# Patient Record
Sex: FEMALE | Race: BLACK OR AFRICAN AMERICAN | URBAN - METROPOLITAN AREA
[De-identification: names, ages, dates, MRNs, and addresses within clinical notes are randomized per-mention and may not be internally consistent; named-entity substitution may affect disease eponyms.]

---

## 2023-07-21 ENCOUNTER — OFFICE VISIT (OUTPATIENT)
Age: 28
End: 2023-07-21
Payer: MEDICAID

## 2023-07-21 VITALS
HEIGHT: 64 IN | DIASTOLIC BLOOD PRESSURE: 61 MMHG | OXYGEN SATURATION: 98 % | HEART RATE: 58 BPM | BODY MASS INDEX: 26.98 KG/M2 | TEMPERATURE: 98.2 F | SYSTOLIC BLOOD PRESSURE: 100 MMHG | WEIGHT: 158 LBS

## 2023-07-21 DIAGNOSIS — N76.0 BACTERIAL VAGINITIS: Primary | ICD-10-CM

## 2023-07-21 DIAGNOSIS — B96.89 BACTERIAL VAGINITIS: Primary | ICD-10-CM

## 2023-07-21 PROCEDURE — 99213 OFFICE O/P EST LOW 20 MIN: CPT | Performed by: NURSE PRACTITIONER

## 2023-07-21 RX ORDER — FLUCONAZOLE 100 MG/1
100 TABLET ORAL DAILY
Qty: 14 TABLET | Refills: 0 | Status: CANCELLED | OUTPATIENT
Start: 2023-07-21 | End: 2023-08-04

## 2023-07-21 RX ORDER — DOLUTEGRAVIR SODIUM 50 MG/1
50 TABLET, FILM COATED ORAL DAILY
COMMUNITY
Start: 2023-06-28

## 2023-07-21 RX ORDER — EMTRICITABINE AND TENOFOVIR ALAFENAMIDE 200; 25 MG/1; MG/1
TABLET ORAL DAILY
COMMUNITY
Start: 2023-06-28

## 2023-07-21 RX ORDER — CLINDAMYCIN PHOSPHATE 20 MG/G
CREAM VAGINAL
Qty: 7 EACH | Refills: 0 | Status: SHIPPED | OUTPATIENT
Start: 2023-07-21

## 2023-07-21 RX ORDER — M-VIT,TX,IRON,MINS/CALC/FOLIC 27MG-0.4MG
1 TABLET ORAL DAILY
COMMUNITY

## 2023-07-21 SDOH — ECONOMIC STABILITY: FOOD INSECURITY: WITHIN THE PAST 12 MONTHS, THE FOOD YOU BOUGHT JUST DIDN'T LAST AND YOU DIDN'T HAVE MONEY TO GET MORE.: PATIENT DECLINED

## 2023-07-21 SDOH — ECONOMIC STABILITY: FOOD INSECURITY: WITHIN THE PAST 12 MONTHS, YOU WORRIED THAT YOUR FOOD WOULD RUN OUT BEFORE YOU GOT MONEY TO BUY MORE.: SOMETIMES TRUE

## 2023-07-21 SDOH — ECONOMIC STABILITY: INCOME INSECURITY: HOW HARD IS IT FOR YOU TO PAY FOR THE VERY BASICS LIKE FOOD, HOUSING, MEDICAL CARE, AND HEATING?: NOT VERY HARD

## 2023-07-21 SDOH — ECONOMIC STABILITY: HOUSING INSECURITY
IN THE LAST 12 MONTHS, WAS THERE A TIME WHEN YOU DID NOT HAVE A STEADY PLACE TO SLEEP OR SLEPT IN A SHELTER (INCLUDING NOW)?: NO

## 2023-07-21 ASSESSMENT — PATIENT HEALTH QUESTIONNAIRE - PHQ9
SUM OF ALL RESPONSES TO PHQ QUESTIONS 1-9: 0
1. LITTLE INTEREST OR PLEASURE IN DOING THINGS: 0
SUM OF ALL RESPONSES TO PHQ QUESTIONS 1-9: 0
SUM OF ALL RESPONSES TO PHQ QUESTIONS 1-9: 0
SUM OF ALL RESPONSES TO PHQ9 QUESTIONS 1 & 2: 0
SUM OF ALL RESPONSES TO PHQ QUESTIONS 1-9: 0
2. FEELING DOWN, DEPRESSED OR HOPELESS: 0

## 2023-07-21 NOTE — PROGRESS NOTES
1. \"Have you been to the ER, urgent care clinic since your last visit? Hospitalized since your last visit? \" No    2. \"Have you seen or consulted any other health care providers outside of the [address] [place] [address] since your last visit? \" Yes [facility] Infectious disease       3. For patients aged 43-73: Has the patient had a colonoscopy / FIT/ Cologuard? NA - based on age      If the patient is female:    4. For patients aged 43-66: Has the patient had a mammogram within the past 2 years? NA - based on age or sex      11. For patients aged 21-65: Has the patient had a pap smear?  Yes - no Care Gap present- nov 2022
Vision grossly intact. Gaze aligned appropriately. Right eye: No discharge. Left eye: No discharge. Conjunctiva/sclera:      Right eye: Right conjunctiva is not injected. No exudate or hemorrhage. Left eye: Left conjunctiva is not injected. No exudate or hemorrhage. Comments: Under bilateral eyes with swelling   Genitourinary:     Comments: Not completed because she brought daughter to her appt          An electronic signature was used to authenticate this note.     --ADELA Carr NP on 7/23/2023 at 9:07 PM

## 2023-07-23 RX ORDER — FLUCONAZOLE 150 MG/1
150 TABLET ORAL
Qty: 3 TABLET | Refills: 0 | Status: SHIPPED | OUTPATIENT
Start: 2023-07-23 | End: 2023-07-30

## 2023-07-23 ASSESSMENT — ENCOUNTER SYMPTOMS: FACIAL SWELLING: 1

## 2023-07-23 ASSESSMENT — VISUAL ACUITY: OU: 1

## 2023-08-02 ENCOUNTER — OFFICE VISIT (OUTPATIENT)
Age: 28
End: 2023-08-02
Payer: MEDICAID

## 2023-08-02 VITALS
RESPIRATION RATE: 17 BRPM | OXYGEN SATURATION: 99 % | HEART RATE: 65 BPM | DIASTOLIC BLOOD PRESSURE: 56 MMHG | TEMPERATURE: 99 F | BODY MASS INDEX: 26.91 KG/M2 | WEIGHT: 157.6 LBS | HEIGHT: 64 IN | SYSTOLIC BLOOD PRESSURE: 111 MMHG

## 2023-08-02 DIAGNOSIS — Z11.59 ENCOUNTER FOR HEPATITIS C SCREENING TEST FOR LOW RISK PATIENT: Primary | ICD-10-CM

## 2023-08-02 DIAGNOSIS — Z12.4 CERVICAL CANCER SCREENING: ICD-10-CM

## 2023-08-02 DIAGNOSIS — N89.8 VAGINAL DISCHARGE: ICD-10-CM

## 2023-08-02 DIAGNOSIS — Z11.3 SCREENING FOR STD (SEXUALLY TRANSMITTED DISEASE): ICD-10-CM

## 2023-08-02 DIAGNOSIS — Z00.00 ANNUAL PHYSICAL EXAM: ICD-10-CM

## 2023-08-02 DIAGNOSIS — Z23 NEED FOR DIPHTHERIA-TETANUS-PERTUSSIS (TDAP) VACCINE: ICD-10-CM

## 2023-08-02 DIAGNOSIS — N63.11 MASS OF UPPER OUTER QUADRANT OF RIGHT BREAST: ICD-10-CM

## 2023-08-02 DIAGNOSIS — B37.31 VAGINAL CANDIDA: ICD-10-CM

## 2023-08-02 PROCEDURE — PBSHW TDAP, BOOSTRIX, (AGE 10 YRS+), IM: Performed by: NURSE PRACTITIONER

## 2023-08-02 PROCEDURE — 99385 PREV VISIT NEW AGE 18-39: CPT | Performed by: NURSE PRACTITIONER

## 2023-08-02 PROCEDURE — 90715 TDAP VACCINE 7 YRS/> IM: CPT | Performed by: NURSE PRACTITIONER

## 2023-08-02 RX ORDER — CHLORHEXIDINE GLUCONATE 0.12 MG/ML
RINSE ORAL
COMMUNITY
Start: 2023-07-30

## 2023-08-02 RX ORDER — FLUCONAZOLE 150 MG/1
150 TABLET ORAL ONCE
Qty: 2 TABLET | Refills: 0 | Status: SHIPPED | OUTPATIENT
Start: 2023-08-02 | End: 2023-08-02

## 2023-08-02 RX ORDER — AMOXICILLIN AND CLAVULANATE POTASSIUM 875; 125 MG/1; MG/1
TABLET, FILM COATED ORAL
COMMUNITY
Start: 2023-07-30

## 2023-08-02 ASSESSMENT — ENCOUNTER SYMPTOMS
ABDOMINAL PAIN: 0
SHORTNESS OF BREATH: 0
VOMITING: 0
NAUSEA: 0
COUGH: 0
CHEST TIGHTNESS: 0

## 2023-08-02 NOTE — PROGRESS NOTES
Dre Sow (:  1995) is a 25 y.o. female, here for evaluation of the following medical concerns:  Chief Complaint   Patient presents with    Annual Exam    Skin Problem     On arms and back due to          ASSESSMENT/PLAN:  1. Encounter for hepatitis C screening test for low risk patient    - Hepatitis C Antibody; Future    2. Cervical cancer screening    - PAP IG, CT-NG-TV, Aptima HPV and rfx 16/18,45 ([id number]); Future    3. Annual physical exam    - CBC with Auto Differential; Future  - Comprehensive Metabolic Panel; Future  - Hemoglobin A1C; Future  - Lipid Panel; Future  - TSH; Future  - Urinalysis with Microscopic; Future    4. Vaginal discharge    - NuSwab VG Plus+Mycoplasmas,JON; Future  - diflucan, start cleocin cream    Return in about 1 year (around 2024) for Grace Boudreaux. She is here today for annual physical, pap and nuswab, and labs. Skin Problem  Pertinent negatives include no cough, fatigue, shortness of breath or vomiting. She reports exposure to fiberglass- no issues noted today    She is taking tivicay and descovy for hx HIV- [facility] ID- undetectable per pt     She is taking supplemental multivitamin and potassium    Tooth abscess- taking amoxicillin and peridex needs dental care    Right breast lump- discomfort, movable, no rash, dx mammogram ordered    Vaginal discharge- hx BV, did not trial the cleocin cream, sent in diflucan as on abx, nuswab today    Review of Systems   Constitutional:  Negative for diaphoresis and fatigue. Respiratory:  Negative for cough, chest tightness and shortness of breath. Cardiovascular:  Negative for chest pain, palpitations and leg swelling. Gastrointestinal:  Negative for abdominal pain, nausea and vomiting. Neurological:  Negative for dizziness, weakness and headaches. Prior to Visit Medications    Medication Sig Taking?  Authorizing Provider   amoxicillin-clavulanate (AUGMENTIN) 875-125 MG per

## 2023-08-02 NOTE — PROGRESS NOTES
1. \"Have you been to the ER, urgent care clinic since your last visit? Hospitalized since your last visit? \" Yes When: 7/29 Where: [facility]  Reason for visit: abscess    2. \"Have you seen or consulted any other health care providers outside of the [address] [place] [address] since your last visit? \" Yes [facility] infectious      3. For patients aged 43-73: Has the patient had a colonoscopy / FIT/ Cologuard? NA - based on age      If the patient is female:    4. For patients aged 43-66: Has the patient had a mammogram within the past 2 years? NA - based on age or sex      11. For patients aged 21-65: Has the patient had a pap smear?  Yes - no Care Gap present

## 2023-08-05 LAB
A/G RATIO: 1.5 RATIO (ref 1.1–2.6)
ALBUMIN SERPL-MCNC: 4.6 G/DL (ref 3.5–5)
ALP BLD-CCNC: 65 U/L (ref 25–115)
ALT SERPL-CCNC: 21 U/L (ref 5–40)
ANION GAP SERPL CALCULATED.3IONS-SCNC: 12 MMOL/L (ref 3–15)
AST SERPL-CCNC: 22 U/L (ref 10–37)
AVERAGE GLUCOSE: 103 MG/DL (ref 91–123)
BACTERIA: NEGATIVE
BILIRUB SERPL-MCNC: 0.2 MG/DL (ref 0.2–1.2)
BILIRUB SERPL-MCNC: NEGATIVE MG/DL
BLOOD: NEGATIVE
BUN BLDV-MCNC: 16 MG/DL (ref 6–22)
CALCIUM SERPL-MCNC: 9.8 MG/DL (ref 8.4–10.5)
CHLORIDE BLD-SCNC: 100 MMOL/L (ref 98–110)
CHOLESTEROL/HDL RATIO: 3.3 (ref 0–5)
CHOLESTEROL: 175 MG/DL (ref 110–200)
CLARITY: CLEAR
CO2: 27 MMOL/L (ref 20–32)
COLOR: YELLOW
CREAT SERPL-MCNC: 0.8 MG/DL (ref 0.5–1.2)
EPITHELIAL CELLS: ABNORMAL /HPF
GLOBULIN: 3.1 G/DL (ref 2–4)
GLOMERULAR FILTRATION RATE: >60 ML/MIN/1.73 SQ.M.
GLUCOSE: 79 MG/DL (ref 70–99)
GLUCOSE: NEGATIVE MG/DL
HBA1C MFR BLD: 5.2 % (ref 4.8–5.6)
HCT VFR BLD CALC: 45.5 % (ref 35.1–46.5)
HDLC SERPL-MCNC: 53 MG/DL
HEMOGLOBIN: 14 G/DL (ref 11.7–15.5)
HEPATITIS BE ANTIGEN: NONREACTIVE
HEPATITIS C ANTIBODY: NORMAL
HIV -1/0/2 AG/AB WITH REFLEX: ABNORMAL
HIV INTERPRETATION: ABNORMAL
HIV-1 ANTIBODY: REACTIVE
HIV-2 AB: NON REACTIVE
HYALINE CASTS: ABNORMAL /LPF (ref 0–2)
KETONES, URINE: NEGATIVE MG/DL
LDL CHOLESTEROL CALCULATED: 113 MG/DL (ref 50–99)
LDL/HDL RATIO: 2.1
LEUKOCYTE ESTERASE, URINE: NEGATIVE
Lab: ABNORMAL
MCH RBC QN AUTO: 31 PG (ref 26–34)
MCHC RBC AUTO-ENTMCNC: 31 G/DL (ref 31–36)
MCV RBC AUTO: 100 FL (ref 80–99)
NITRITE, URINE: NEGATIVE
NON-HDL CHOLESTEROL: 122 MG/DL
NORMACHROMIC RBC: NORMAL
NORMACYTIC RBC: NORMAL
PDW BLD-RTO: 13.3 % (ref 10–15.5)
PH, URINE: 6.5 PH (ref 5–8)
PLATELET # BLD: 95 K/UL (ref 140–440)
PLATELET, IMMATURE FRACTION: 18.7 % (ref 1.1–7.1)
PMV BLD AUTO: 12.8 FL (ref 9–13)
POTASSIUM SERPL-SCNC: 4.6 MMOL/L (ref 3.5–5.5)
PROTEIN UA: NEGATIVE MG/DL
RBC URINE: ABNORMAL /HPF
RBC: 4.57 M/UL (ref 3.8–5.2)
SMEAR REVIEW: NORMAL
SODIUM BLD-SCNC: 139 MMOL/L (ref 133–145)
SPECIFIC GRAVITY: 1 (ref 1–1.03)
T. PALLIDUM, IGG/IGM: NON REACTIVE
TOTAL PROTEIN: 7.7 G/DL (ref 6.4–8.3)
TRIGL SERPL-MCNC: 46 MG/DL (ref 40–149)
TSH SERPL DL<=0.05 MIU/L-ACNC: 0.41 MCU/ML (ref 0.27–4.2)
UROBILINOGEN: 0.2 MG/DL
VLDLC SERPL CALC-MCNC: 9 MG/DL (ref 8–30)
WBC UA: ABNORMAL /HPF (ref 0–5)
WBC: 4.6 K/UL (ref 4–11)

## 2023-08-08 LAB
BACTERIAL VAGINOSIS, NAA: POSITIVE
C TRACH DNA SPEC NAA+PROBE: NEGATIVE
CANDIDA GLABRATA, NAA: NEGATIVE
CANDIDA SPECIES, NAA: POSITIVE
MYCOPLASMA GENITALIUM, SWAB: NEGATIVE
MYCOPLASMA HOMINIS, SWAB: NEGATIVE
NEISSERIA GONORRHOEAE, NAA: NEGATIVE
TRICHOMONAS VAGINALIS BY NAA: NEGATIVE
UREAPLASMA SPP, SWAB: POSITIVE

## 2023-08-09 LAB
CHLAMYDIA TRACHOMATIS THINPREP: NEGATIVE
HPV DNA HIGH RISK: NEGATIVE
HPV GENOTYPE: NEGATIVE
HPV, GENOTYPE 18: NEGATIVE
NEISSERIA GONORRHOEAE THINPREP: NEGATIVE
TRICHOMONAS NUC AMP-THIN PREP: NEGATIVE

## 2023-08-10 ENCOUNTER — TELEPHONE (OUTPATIENT)
Age: 28
End: 2023-08-10

## 2023-08-10 NOTE — TELEPHONE ENCOUNTER
----- Message from ADELA Ruth NP sent at 8/6/2023  8:49 PM EDT -----  Labs show that your platelets are very low please discuss this immediately with your ID provider.

## 2023-08-10 NOTE — TELEPHONE ENCOUNTER
Reached out to patient in regards to labs. But there was no answer. Left vm to give our office a call.

## 2023-08-11 LAB — PAP IMAGE GUIDED: NORMAL

## 2023-08-14 DIAGNOSIS — A49.3 NONGONOCOCCAL URETHRITIS DUE TO UREAPLASMA UREALYTICUM: Primary | ICD-10-CM

## 2023-08-14 DIAGNOSIS — N34.1 NONGONOCOCCAL URETHRITIS DUE TO UREAPLASMA UREALYTICUM: Primary | ICD-10-CM

## 2023-08-14 DIAGNOSIS — B37.31 CANDIDA VAGINITIS: ICD-10-CM

## 2023-08-14 RX ORDER — DOXYCYCLINE HYCLATE 100 MG
100 TABLET ORAL 2 TIMES DAILY
Qty: 20 TABLET | Refills: 0 | Status: SHIPPED | OUTPATIENT
Start: 2023-08-14 | End: 2023-08-24

## 2023-08-14 RX ORDER — FLUCONAZOLE 150 MG/1
150 TABLET ORAL ONCE
Qty: 1 TABLET | Refills: 0 | Status: SHIPPED | OUTPATIENT
Start: 2023-08-14 | End: 2023-08-14

## 2023-09-18 ENCOUNTER — OFFICE VISIT (OUTPATIENT)
Age: 28
End: 2023-09-18

## 2023-09-18 DIAGNOSIS — Z91.199 NO-SHOW FOR APPOINTMENT: Primary | ICD-10-CM

## 2023-09-18 NOTE — PROGRESS NOTES
1. \"Have you been to the ER, urgent care clinic since your last visit? Hospitalized since your last visit? \" No    2. \"Have you seen or consulted any other health care providers outside of the [address] [place] [address] since your last visit? \" Yes ID      3. For patients aged 43-73: Has the patient had a colonoscopy / FIT/ Cologuard? NA - based on age      If the patient is female:    4. For patients aged 43-66: Has the patient had a mammogram within the past 2 years? NA - based on age or sex      11. For patients aged 21-65: Has the patient had a pap smear? Yes - Care Gap present.  Most recent result on file

## 2023-09-22 ENCOUNTER — PATIENT MESSAGE (OUTPATIENT)
Age: 28
End: 2023-09-22

## 2023-09-22 PROBLEM — Z21 ASYMPTOMATIC HIV INFECTION, WITH NO HISTORY OF HIV-RELATED ILLNESS (HCC): Status: ACTIVE | Noted: 2023-09-22

## 2024-08-02 DIAGNOSIS — Z00.00 ANNUAL PHYSICAL EXAM: Primary | ICD-10-CM

## 2025-02-10 ENCOUNTER — OFFICE VISIT (OUTPATIENT)
Facility: CLINIC | Age: 30
End: 2025-02-10
Payer: MEDICAID

## 2025-02-10 VITALS
OXYGEN SATURATION: 97 % | HEART RATE: 62 BPM | WEIGHT: 166 LBS | DIASTOLIC BLOOD PRESSURE: 48 MMHG | BODY MASS INDEX: 28.34 KG/M2 | HEIGHT: 64 IN | SYSTOLIC BLOOD PRESSURE: 99 MMHG

## 2025-02-10 DIAGNOSIS — S66.912A WRIST STRAIN, LEFT, INITIAL ENCOUNTER: Primary | ICD-10-CM

## 2025-02-10 PROCEDURE — 99213 OFFICE O/P EST LOW 20 MIN: CPT | Performed by: NURSE PRACTITIONER

## 2025-02-10 RX ORDER — IBUPROFEN 600 MG/1
TABLET, FILM COATED ORAL
COMMUNITY
Start: 2024-11-19

## 2025-02-10 RX ORDER — PSEUDOEPHED/ACETAMINOPH/DIPHEN 30MG-500MG
1 TABLET ORAL 3 TIMES DAILY
COMMUNITY
Start: 2024-11-19

## 2025-02-10 ASSESSMENT — PATIENT HEALTH QUESTIONNAIRE - PHQ9
SUM OF ALL RESPONSES TO PHQ QUESTIONS 1-9: 0
SUM OF ALL RESPONSES TO PHQ9 QUESTIONS 1 & 2: 0
SUM OF ALL RESPONSES TO PHQ QUESTIONS 1-9: 0
1. LITTLE INTEREST OR PLEASURE IN DOING THINGS: NOT AT ALL
SUM OF ALL RESPONSES TO PHQ QUESTIONS 1-9: 0
2. FEELING DOWN, DEPRESSED OR HOPELESS: NOT AT ALL
SUM OF ALL RESPONSES TO PHQ QUESTIONS 1-9: 0

## 2025-02-10 ASSESSMENT — ENCOUNTER SYMPTOMS
NAUSEA: 0
VOMITING: 0
CHEST TIGHTNESS: 0
ABDOMINAL PAIN: 0
COUGH: 0
SHORTNESS OF BREATH: 0

## 2025-02-10 NOTE — PROGRESS NOTES
\"Have you been to the ER, urgent care clinic since your last visit?  Hospitalized since your last visit?\"    NO    “Have you seen or consulted any other health care providers outside our system since your last visit?”    NO           
08/02/2023    GLOB 3.1 08/02/2023     Lab Results   Component Value Date    TSH 0.41 08/02/2023     Hemoglobin A1C   Date Value Ref Range Status   08/02/2023 5.2 4.8 - 5.6 % Final     Lab Results   Component Value Date    CHOL 175 08/02/2023     Lab Results   Component Value Date    TRIG 46 08/02/2023     Lab Results   Component Value Date    HDL 53 08/02/2023     No components found for: \"LDLCHOLESTEROL\", \"LDLCALC\"  Lab Results   Component Value Date    VLDL 9 08/02/2023     Lab Results   Component Value Date    CHOLHDLRATIO 3.3 08/02/2023         Physical Exam    Disclaimer:    The patient understands our medical plan.  Alternatives have been explained and offered.  The risks, benefits and significant side effects of all medications have been reviewed. Anticipated time course and progression of condition reviewed. All questions have been addressed.  She is encouraged to employ the information provided in the after visit summary, which was reviewed.      Where applicable, she is instructed to call the clinic if she has not been notified either by phone or through [employer] with the results of her tests/labs or with an appointment plan for any referrals within 1 week(s). The patient is to call if her condition worsens or fails to improve or if significant side effects are experienced.     Please note that this dictation was completed with [employer], the [employer] voice recognition software. Quite often unanticipated grammatical, syntax, homophones, and other interpretive errors are inadvertently transcribed by the computer software. Please disregard these errors. Please excuse any errors that have escaped final proofreading.    An electronic signature was used to authenticate this note.          --ADELA Swenson - NP on 2/12/2025 at 2:37 PM

## 2025-02-14 ENCOUNTER — OFFICE VISIT (OUTPATIENT)
Facility: CLINIC | Age: 30
End: 2025-02-14
Payer: MEDICAID

## 2025-02-14 ENCOUNTER — HOSPITAL ENCOUNTER (OUTPATIENT)
Facility: HOSPITAL | Age: 30
Discharge: HOME | End: 2025-02-17
Payer: MEDICAID

## 2025-02-14 VITALS
OXYGEN SATURATION: 95 % | SYSTOLIC BLOOD PRESSURE: 112 MMHG | HEART RATE: 68 BPM | DIASTOLIC BLOOD PRESSURE: 72 MMHG | HEIGHT: 64 IN | BODY MASS INDEX: 28.34 KG/M2 | WEIGHT: 166 LBS

## 2025-02-14 DIAGNOSIS — M25.532 ACUTE PAIN OF LEFT WRIST: ICD-10-CM

## 2025-02-14 DIAGNOSIS — S66.912A WRIST STRAIN, LEFT, INITIAL ENCOUNTER: Primary | ICD-10-CM

## 2025-02-14 PROCEDURE — 99213 OFFICE O/P EST LOW 20 MIN: CPT | Performed by: NURSE PRACTITIONER

## 2025-02-14 PROCEDURE — 73100 X-RAY EXAM OF WRIST: CPT

## 2025-02-14 ASSESSMENT — PATIENT HEALTH QUESTIONNAIRE - PHQ9
SUM OF ALL RESPONSES TO PHQ9 QUESTIONS 1 & 2: 0
SUM OF ALL RESPONSES TO PHQ QUESTIONS 1-9: 0
1. LITTLE INTEREST OR PLEASURE IN DOING THINGS: NOT AT ALL
2. FEELING DOWN, DEPRESSED OR HOPELESS: NOT AT ALL
SUM OF ALL RESPONSES TO PHQ QUESTIONS 1-9: 0

## 2025-02-14 NOTE — PROGRESS NOTES
Keya Jackson (:  1995) is a 29 y.o. female, here for evaluation of the following medical concerns:  Chief Complaint   Patient presents with    Wrist Pain     Pain is same from last visit, wearing braces.          ASSESSMENT/PLAN:    1. Wrist strain, left, initial encounter  2. Acute pain of left wrist  -     [facility] - Mj Correa DO, Orthopedic Surgery(Hand, Elbow & Wrist), [facility])  -     [facility] - [facility] - [facility]     No follow-ups on file.        HPI  She continues to have left wrist pain thinks related to weight lifting as no other injury/trauma  The pain is worsening. She is wearing a brace without much relief.   Right hand dominant      Review of Systems   Musculoskeletal:  Positive for arthralgias.       Prior to Visit Medications    Medication Sig Taking? Authorizing Provider   Acetaminophen Extra Strength 500 MG TABS Take 1 tablet by mouth 3 times daily Yes ProviderRosette MD   ibuprofen (ADVIL;MOTRIN) 600 MG tablet TAKE 1 TABLET BY MOUTH 3 TIMES A DAY FOR PAIN Yes Provider, MD Rosette   amoxicillin-clavulanate (AUGMENTIN) 875-125 MG per tablet TAKE 1 TABLET BY MOUTH TWICE DAILY FOR 10 DAYS Yes Provider, MD Rosette   chlorhexidine (PERIDEX) 0.12 % solution TAKE 15 MLS BY MOUTH TWICE DAILY Yes ProvideroRsette MD   DESCOVY 200-25 MG TABS tablet daily Yes Rosette Shen MD   TIVICAY 50 MG tablet Take 1 tablet by mouth daily Yes ProviderRosette MD   Potassium (POTASSIMIN PO) Take by mouth Daily Yes ProviderRosette MD   Multiple Vitamins-Minerals (THERAPEUTIC MULTIVITAMIN-MINERALS) tablet Take 1 tablet by mouth daily Yes ProviderRosette MD   clindamycin (CLEOCIN) 2 % vaginal cream Place one applicator full per vagina at bedtime once daily for 7 days. Do not have intercourse while on this medication and for 5 days after completion of medication. Yes Orly Major, ADELA - NP        Social History

## 2025-02-24 ENCOUNTER — OFFICE VISIT (OUTPATIENT)
Age: 30
End: 2025-02-24
Payer: MEDICAID

## 2025-02-24 VITALS — WEIGHT: 156.8 LBS | BODY MASS INDEX: 26.77 KG/M2 | HEIGHT: 64 IN

## 2025-02-24 DIAGNOSIS — Q74.0: ICD-10-CM

## 2025-02-24 DIAGNOSIS — M25.832 ULNAR IMPINGEMENT SYNDROME OF LEFT UPPER EXTREMITY: Primary | ICD-10-CM

## 2025-02-24 PROCEDURE — 20605 DRAIN/INJ JOINT/BURSA W/O US: CPT | Performed by: ORTHOPAEDIC SURGERY

## 2025-02-24 PROCEDURE — 99204 OFFICE O/P NEW MOD 45 MIN: CPT | Performed by: ORTHOPAEDIC SURGERY

## 2025-02-24 RX ORDER — LIDOCAINE HYDROCHLORIDE 10 MG/ML
0.5 INJECTION, SOLUTION INFILTRATION; PERINEURAL ONCE
Status: COMPLETED | OUTPATIENT
Start: 2025-02-24 | End: 2025-02-24

## 2025-02-24 RX ADMIN — LIDOCAINE HYDROCHLORIDE 0.5 ML: 10 INJECTION, SOLUTION INFILTRATION; PERINEURAL at 10:09

## 2025-02-24 NOTE — PROGRESS NOTES
Keya Jackson is a 29 y.o. female right handed works from home.  Worker's Compensation and legal considerations: none    Chief Complaint   Patient presents with    Wrist Pain     Left       Pain Score:   2    Subjective:     Initial HPI: Patient presents today with complaints of ulnar-sided wrist pain on the left side.  She reports after increasing the amount she is been working out she noted the pain.  She denies any specific injury.    Date of onset: December 2024  Injury: No  Prior Treatment:  No  Contributory history: None    ROS: Review of Systems - General ROS: negative except HPI    Past Medical History:   Diagnosis Date    ADHD (attention deficit hyperactivity disorder) 2009    HIV (human immunodeficiency virus infection) ([facility]ly [facility])        History reviewed. No pertinent surgical history.     Current Outpatient Medications   Medication Sig Dispense Refill    DESCOVY 200-25 MG TABS tablet daily      TIVICAY 50 MG tablet Take 1 tablet by mouth daily      Multiple Vitamins-Minerals (THERAPEUTIC MULTIVITAMIN-MINERALS) tablet Take 1 tablet by mouth daily       Current Facility-Administered Medications   Medication Dose Route Frequency Provider Last Rate Last Admin    lidocaine 1 % injection 0.5 mL  0.5 mL Other Once         triamcinolone acetonide (KENALOG) injection 5 mg  5 mg Intra-artICUlar Once            No Known Allergies      Ht 1.626 m (5' 4\")   Wt 71.1 kg (156 lb 12.8 oz)   LMP 01/31/2025   BMI 26.91 kg/m²   Physical Exam  Vitals and nursing note reviewed.   Constitutional:       General: She is not in acute distress.     Appearance: Normal appearance. She is not ill-appearing.   Cardiovascular:      Pulses: Normal pulses.   Pulmonary:      Effort: Pulmonary effort is normal. No respiratory distress.   Musculoskeletal:         General: Tenderness present. No swelling, deformity or signs of injury. Normal range of motion.      Cervical back: Normal range of motion and neck supple.      Right lower

## 2025-03-31 ENCOUNTER — HOSPITAL ENCOUNTER (OUTPATIENT)
Facility: HOSPITAL | Age: 30
Setting detail: RECURRING SERIES
Discharge: HOME | End: 2025-04-03
Payer: MEDICAID

## 2025-03-31 PROCEDURE — 97165 OT EVAL LOW COMPLEX 30 MIN: CPT

## 2025-03-31 NOTE — PROGRESS NOTES
OCCUPATIONAL THERAPY - DAILY TREATMENT NOTE    Patient Name: Keya Jackson    Date: 3/31/2025    : 1995  Insurance: Payor: [facility] MEDICAID / Plan: [facility] COMMUNITY PLAN CARDINAL CARE / Product Type: *No Product type* /        Ins Auth:     Next PN Due By:                          Patient  verified Yes     Visit #   Current / Total 1 12   Time   In / Out 840 915   Total Treatment Time 35   Pain   In / Out 5 5   Subjective Functional Status/Changes: See POC     TREATMENT AREA =  Pain in left wrist [M25.532]    OBJECTIVE    EVAL - 30 minutes    Therapeutic Procedures:  Tx Min Billable or 1:1 Min (if diff from Tx Min) Procedure, Rationale, Specifics        5  32228 Self Care/Home Management (timed):  improve patient knowledge and understanding of home injury/symptom/pain management, positioning, and activity modification  to increase ability to complete ADLs/IADLs independently  (see flow sheet as applicable)      Size B tubular sleeves  Ulnar nerve glides  Wrist UD isometrics                      TOTAL TREATMENT TIME:  (Total Time - Paraffin/Vaso/Fluido)        5       Billed concurrently with other treatments Patient Education:  Reviewed HEP     Objective Information/Functional Measures/Assessment    See POC         Assessment/Plan:    See POC             []  See Progress Note/Re certification     Patient will continue to benefit from skilled OT services to modify and progress therapeutic interventions, analyze and address functional mobility deficits, analyze and address ROM deficits, analyze and address strength deficits, analyze and address soft tissue restrictions, analyze and cue for proper movement patterns, and instruct in home and community integration  to attain remaining goals.   If an interpreting service was utilized for treatment of this patient, the contents of this document represent the material reviewed with the patient via the .   Progress toward goals / Updated

## 2025-03-31 NOTE — PROGRESS NOTES
[facility]  [address] [facility], [address] Ph:[phone] Fx: [phone]  Plan of Care / Statement of Necessity for Occupational Therapy Services     Patient Name: Keya Jackson : 1995   Medical   Diagnosis: Pain in left wrist [M25.532] Treatment Diagnosis: M25.532  LEFT WRIST PAIN      Onset Date: 25 Payor :  Payor: [facility] MEDICAID / Plan: [facility] PLAN CARDINAL CARE / Product Type: *No Product type* /    Referral Source: Orly Major AP* Start of Care (SOC): 3/31/2025   Prior Hospitalization: See medical history Provider #: [id number]   Prior Level of Function: Has two children and a puppy   Comorbidities:  Social determinants of health: Alcohol and Depression     Subjective: pt is a right hand dominant (however completes majority of tasks with bilateral hands), 30 y.o. female who presents to evaluation for the left wrist. Patient reports 5/10 pain rating along with chief c/o of left wrist pain at the ulnar side, weakness, and intermittent \"pins and needles\" along the ulnar styloid. Pt reports she is now also having pain on the right wrist at the ulnar styloid and at the first dorsal compartment. Pt reports she thinks she injured her hand while lifting at the gym with poor form. Patient reports they do not use heat or ice at home. Pt was seen by Dr. Correa and provided an injection on 25, which assisted with pain levels initially. Pt reports she has not had an MRI yet.     Imaging:   XR WRIST LEFT (2 VIEWS) 2025 independently reviewed on 2025 and agree with the below  FINDINGS: Positive ulnar variance and mild subchondral sclerosis at the ulnar head. Small, rounded sclerotic focus at the ulnar head is seen on lateral view, likely a bone island. No evidence of acute fracture or dislocation. The joint spaces are preserved. There is no evidence of erosions or periostitis. The soft tissues are

## 2025-04-01 ENCOUNTER — TELEPHONE (OUTPATIENT)
Age: 30
End: 2025-04-01

## 2025-04-01 DIAGNOSIS — M25.832 ULNAR IMPINGEMENT SYNDROME OF LEFT UPPER EXTREMITY: Primary | ICD-10-CM

## 2025-04-01 DIAGNOSIS — Q74.0: ICD-10-CM

## 2025-04-01 NOTE — TELEPHONE ENCOUNTER
Pt is requesting an MRI be placed for her left wrist pain with [facility].    Callback# [phone]

## 2025-04-01 NOTE — TELEPHONE ENCOUNTER
4/1/25 Called patient but no answer. Left a generic voice mail to call our office to discuss her message.

## 2025-04-01 NOTE — TELEPHONE ENCOUNTER
MRI Has been ordered. Please let patient know and also let her know that sometimes insurance will not cover this without therapy first, but we will have to wait and see.

## 2025-04-01 NOTE — TELEPHONE ENCOUNTER
4/1/25 Patient called us back. She understands that therapy may need to be done first before getting approved for an MRI. She is scheduled to start her therapy.

## 2025-04-21 ENCOUNTER — HOSPITAL ENCOUNTER (OUTPATIENT)
Facility: HOSPITAL | Age: 30
Setting detail: RECURRING SERIES
Discharge: HOME | End: 2025-04-24
Payer: MEDICAID

## 2025-04-21 PROCEDURE — 97032 APPL MODALITY 1+ESTIM EA 15: CPT

## 2025-04-21 PROCEDURE — 97110 THERAPEUTIC EXERCISES: CPT

## 2025-04-21 PROCEDURE — 97530 THERAPEUTIC ACTIVITIES: CPT

## 2025-04-21 PROCEDURE — 97018 PARAFFIN BATH THERAPY: CPT

## 2025-04-21 NOTE — PROGRESS NOTES
OCCUPATIONAL THERAPY - DAILY TREATMENT NOTE    Patient Name: Keya Jackson    Date: 2025    : 1995  Insurance: Payor: [facility] MEDICAID / Plan: [facility] PLAN CARDINAL CARE / Product Type: *No Product type* /      Ins Auth:     Next PN Due By:                        Patient  verified Yes     Visit #   Current / Total 2 12   Time   In / Out 120 204   Total Treatment Time 44   Pain   In / Out 1 0   Subjective Functional Status/Changes: Pt reporting she has her MRI this week     TREATMENT AREA =  Pain in left wrist [M25.532]  Left wrist pain [M25.532]    OBJECTIVE    Paraffin, details: left wrist/hand     Min Rationale   8 decrease pain and increase tissue extensibility to improve patient's ability to progress to PLOF and address remaining functional goals.     Skin assessment post-treatment:   Intact         Estim Attended (TIMED), cold laser type/location: left wrist- ulnar side     Min Rationale   8 decrease pain and increase tissue extensibility to improve patient's ability to progress to PLOF and address remaining functional goals.     Skin assessment post-treatment:   Intact       Therapeutic Procedures:  Tx Min Billable or 1:1 Min (if diff from Tx Min) Procedure, Rationale, Specifics   10  29299 Therapeutic Exercise (timed):  increase ROM, strength, coordination, and proprioception to  increase independence for ADL/IADL tasks (see flow sheet as applicable)    Soft medium soft putty find to reveal/remove x11 pegs  Towel scrunches x3  WB into soft medium soft putty to reveal x4 pegs   8  13085 Therapeutic Activity (timed):  use of dynamic activities replicating functional movements to increase ability to complete ADLs/IADLs independently (see flow sheet as applicable)    Edema massage to left wrist with education on how to complete at home.                          TOTAL TREATMENT TIME:  (Total Time - Paraffin/Vaso/Fluido)        36       Billed concurrently with other treatments

## 2025-04-21 NOTE — PROGRESS NOTES
OCCUPATIONAL THERAPY - DAILY TREATMENT NOTE    Patient Name: Keya Jackson    Date: 2025    : 1995  Insurance: Payor: [facility] MEDICAID / Plan: [facility] CARDINAL CARE / Product Type: *No Product type* /        Ins Auth:  ***          Next PN Due By:                    ***         Patient  verified Yes     Visit #   Current / Total *** ***   Time   In / Out *** ***   Total Treatment Time ***   Pain   In / Out *** ***   Subjective Functional Status/Changes: ***     TREATMENT AREA =  Pain in left wrist [M25.532]  Left wrist pain [M25.532]    OBJECTIVE      Modalities Rationale:   decrease inflammation, decrease pain, and increase muscle contraction/control to improve the patient’s ability to participate in Tx                                                                    1:1 time/Billable      min [] Estim, type/location:       [x]  att       []  w/ice    []  w/heat    min []  Ultrasound: Duty Cycle:  Frequency:  W/cm2                                                               Non 1:1 time/Non billable      min []  Ice     []  Heat     Location/Position:                                                                Non 1:1 time/Billable   8 min [x]  Paraffin:       Location:  Left wrist / hand     min []  Vasopneumatic Device Pressure/Temp:  Location:  Pre:  Post:      min []  Fluido/[place]: Location:  Position: AROM   [x] Skin assessment post-treatment (if applicable):    []  intact    []  redness- no adverse reaction     []redness - adverse reaction:          Therapeutic Procedures:  Tx Min Billable or 1:1 Min (if diff from Tx Min) Procedure, Rationale, Specifics     46875 Therapeutic Exercise (timed):  increase ROM, strength, coordination, and proprioception to increase functional use  (see flow sheet as applicable)    Left   Ulnar nerve glides x***       36882 Neuromuscular Re-Education (timed):  increase proprioception ROM, strength and muscle firing to return to PLOF.

## 2025-04-22 ENCOUNTER — TELEPHONE (OUTPATIENT)
Facility: HOSPITAL | Age: 30
End: 2025-04-22

## 2025-04-23 ENCOUNTER — HOSPITAL ENCOUNTER (OUTPATIENT)
Facility: HOSPITAL | Age: 30
Discharge: HOME | End: 2025-04-26
Attending: ORTHOPAEDIC SURGERY
Payer: MEDICAID

## 2025-04-23 DIAGNOSIS — Q74.0: ICD-10-CM

## 2025-04-23 DIAGNOSIS — M25.832 ULNAR IMPINGEMENT SYNDROME OF LEFT UPPER EXTREMITY: ICD-10-CM

## 2025-04-23 PROCEDURE — 6360000002 HC RX W HCPCS: Performed by: ORTHOPAEDIC SURGERY

## 2025-04-23 PROCEDURE — A9577 INJ MULTIHANCE: HCPCS | Performed by: ORTHOPAEDIC SURGERY

## 2025-04-23 PROCEDURE — 77002 NEEDLE LOCALIZATION BY XRAY: CPT

## 2025-04-23 PROCEDURE — 6360000004 HC RX CONTRAST MEDICATION: Performed by: ORTHOPAEDIC SURGERY

## 2025-04-23 PROCEDURE — 2500000003 HC RX 250 WO HCPCS: Performed by: ORTHOPAEDIC SURGERY

## 2025-04-23 RX ORDER — SODIUM CHLORIDE 0.9 % (FLUSH) 0.9 %
5-40 SYRINGE (ML) INJECTION 2 TIMES DAILY
Status: DISCONTINUED | OUTPATIENT
Start: 2025-04-23 | End: 2025-04-27 | Stop reason: HOSPADM

## 2025-04-23 RX ORDER — LIDOCAINE HYDROCHLORIDE 10 MG/ML
10 INJECTION, SOLUTION EPIDURAL; INFILTRATION; INTRACAUDAL; PERINEURAL ONCE
Status: COMPLETED | OUTPATIENT
Start: 2025-04-23 | End: 2025-04-23

## 2025-04-23 RX ORDER — IOPAMIDOL 408 MG/ML
10 INJECTION, SOLUTION INTRATHECAL ONCE
Status: COMPLETED | OUTPATIENT
Start: 2025-04-23 | End: 2025-04-23

## 2025-04-23 RX ADMIN — SODIUM CHLORIDE, PRESERVATIVE FREE 10 ML: 5 INJECTION INTRAVENOUS at 13:59

## 2025-04-23 RX ADMIN — GADOBENATE DIMEGLUMINE 0.15 ML: 529 INJECTION, SOLUTION INTRAVENOUS at 13:59

## 2025-04-23 RX ADMIN — IOPAMIDOL 5 ML: 408 INJECTION, SOLUTION INTRATHECAL at 13:56

## 2025-04-23 RX ADMIN — LIDOCAINE HYDROCHLORIDE 10 ML: 10 INJECTION, SOLUTION EPIDURAL; INFILTRATION; INTRACAUDAL; PERINEURAL at 13:53

## 2025-05-01 ENCOUNTER — TELEPHONE (OUTPATIENT)
Facility: HOSPITAL | Age: 30
End: 2025-05-01

## 2025-05-01 NOTE — TELEPHONE ENCOUNTER
JAMARCUS informing of second N/S this week and pt to be discharged at this time with all remaining appts to be CX. Informed pt to complete MRI on hand and follow up with Dr. Correa

## 2025-05-07 ENCOUNTER — TRANSCRIBE ORDERS (OUTPATIENT)
Facility: HOSPITAL | Age: 30
End: 2025-05-07

## 2025-05-07 DIAGNOSIS — R52 PAIN: Primary | ICD-10-CM

## 2025-05-12 ENCOUNTER — OFFICE VISIT (OUTPATIENT)
Facility: CLINIC | Age: 30
End: 2025-05-12
Payer: MEDICAID

## 2025-05-12 VITALS
HEIGHT: 64 IN | TEMPERATURE: 97.5 F | BODY MASS INDEX: 25.95 KG/M2 | DIASTOLIC BLOOD PRESSURE: 78 MMHG | SYSTOLIC BLOOD PRESSURE: 118 MMHG | OXYGEN SATURATION: 98 % | WEIGHT: 152 LBS | HEART RATE: 100 BPM

## 2025-05-12 DIAGNOSIS — Z32.02 PREGNANCY EXAMINATION OR TEST, NEGATIVE RESULT: Primary | ICD-10-CM

## 2025-05-12 LAB
HCG, PREGNANCY, URINE, POC: NEGATIVE
VALID INTERNAL CONTROL, POC: NORMAL

## 2025-05-12 PROCEDURE — 81025 URINE PREGNANCY TEST: CPT | Performed by: NURSE PRACTITIONER

## 2025-05-12 PROCEDURE — 99213 OFFICE O/P EST LOW 20 MIN: CPT | Performed by: NURSE PRACTITIONER

## 2025-05-12 SDOH — ECONOMIC STABILITY: FOOD INSECURITY: WITHIN THE PAST 12 MONTHS, YOU WORRIED THAT YOUR FOOD WOULD RUN OUT BEFORE YOU GOT MONEY TO BUY MORE.: NEVER TRUE

## 2025-05-12 SDOH — ECONOMIC STABILITY: FOOD INSECURITY: WITHIN THE PAST 12 MONTHS, THE FOOD YOU BOUGHT JUST DIDN'T LAST AND YOU DIDN'T HAVE MONEY TO GET MORE.: NEVER TRUE

## 2025-05-12 ASSESSMENT — PATIENT HEALTH QUESTIONNAIRE - PHQ9
2. FEELING DOWN, DEPRESSED OR HOPELESS: NOT AT ALL
1. LITTLE INTEREST OR PLEASURE IN DOING THINGS: NOT AT ALL
SUM OF ALL RESPONSES TO PHQ QUESTIONS 1-9: 0

## 2025-05-12 ASSESSMENT — ENCOUNTER SYMPTOMS
CHEST TIGHTNESS: 0
SHORTNESS OF BREATH: 0
ABDOMINAL PAIN: 0
VOMITING: 0
COUGH: 0
NAUSEA: 0

## 2025-05-12 NOTE — PROGRESS NOTES
Keya Jackson (:  1995) is a 30 y.o. female, here for evaluation of the following medical concerns:  Chief Complaint   Patient presents with    Pregnancy Test     MRI for wrist, was told to be certain before can have done.          ASSESSMENT/PLAN:    Assessment & Plan  1. Pregnancy.  - The in-office pregnancy test yielded a negative result.  - Patient reported faint lines on home tests but confirmed negative in-office test.  - Discussion held regarding proceeding with the scheduled MRI at [facility].  - Advised to inform the clinic immediately if any complications arise.  Pregnancy examination or test, negative result    Results  Labs   - Pregnancy test: Negative           History of Present Illness  The patient presents for evaluation of pregnancy.    She has been sexually active with her partner on multiple occasions over the past 2 weeks. Home pregnancy tests have yielded negative results, although she reports observing a faint line. A pregnancy test conducted during this visit was confirmed negative. She is not experiencing any other health concerns or complications at this time.      Review of Systems   Constitutional:  Negative for diaphoresis and fatigue.   Respiratory:  Negative for cough, chest tightness and shortness of breath.    Cardiovascular:  Negative for chest pain, palpitations and leg swelling.   Gastrointestinal:  Negative for abdominal pain, nausea and vomiting.   Neurological:  Negative for dizziness, weakness and headaches.       Prior to Visit Medications    Medication Sig Taking? Authorizing Provider   DESCOVY 200-25 MG TABS tablet daily Yes Rosette Shen MD   TIVICAY 50 MG tablet Take 1 tablet by mouth daily Yes Rosette Shen MD   Multiple Vitamins-Minerals (THERAPEUTIC MULTIVITAMIN-MINERALS) tablet Take 1 tablet by mouth daily Yes Rosette Shen MD        Social History     Tobacco Use    Smoking status: Former     Current packs/day: 0.00     Average

## 2025-05-12 NOTE — PROGRESS NOTES
Have you been to the ER, urgent care clinic since your last visit?  Hospitalized since your last visit?   N/A  Have you seen or consulted any other health care providers outside our system since your last visit?   2/24/2025, 3/31/2025 4/24/2025ORTHO  4/21/2025, 4/28/2025, 5/1/2025 PT  4/23/2025- Radiology

## 2025-05-13 ENCOUNTER — TELEPHONE (OUTPATIENT)
Age: 30
End: 2025-05-13

## 2025-05-13 DIAGNOSIS — Q74.0: ICD-10-CM

## 2025-05-13 DIAGNOSIS — M25.832 ULNAR IMPINGEMENT SYNDROME OF LEFT UPPER EXTREMITY: Primary | ICD-10-CM

## 2025-05-13 NOTE — TELEPHONE ENCOUNTER
5/13/25 Patient was called. Left message that order was faxed and someone will be reaching out in the next couple of weeks.

## 2025-05-13 NOTE — TELEPHONE ENCOUNTER
Order faxed to [facility] central scheduling. They should reach out in the next couple of weeks.

## 2025-05-13 NOTE — TELEPHONE ENCOUNTER
Patient is requesting a call back because she is requesting a different facility to get the MRI and injection done at, because [facility] can do the MRI  but for the injection part the patient states that she can not accept the two times (8am & 1pm) that they offer for the injection part    Patient tel [phone]

## 2025-06-18 ENCOUNTER — OFFICE VISIT (OUTPATIENT)
Facility: CLINIC | Age: 30
End: 2025-06-18
Payer: MEDICAID

## 2025-06-18 ENCOUNTER — HOSPITAL ENCOUNTER (OUTPATIENT)
Facility: HOSPITAL | Age: 30
Setting detail: SPECIMEN
Discharge: HOME | End: 2025-06-21

## 2025-06-18 VITALS
TEMPERATURE: 98.1 F | SYSTOLIC BLOOD PRESSURE: 112 MMHG | DIASTOLIC BLOOD PRESSURE: 76 MMHG | WEIGHT: 154.4 LBS | OXYGEN SATURATION: 99 % | HEART RATE: 73 BPM | BODY MASS INDEX: 26.5 KG/M2

## 2025-06-18 DIAGNOSIS — N89.8 VAGINAL DISCHARGE: ICD-10-CM

## 2025-06-18 DIAGNOSIS — Z20.2 STD EXPOSURE: Primary | ICD-10-CM

## 2025-06-18 LAB
BASOPHILS # BLD: 1 % (ref 0–2)
BASOPHILS ABSOLUTE: 0 K/UL (ref 0–0.2)
BILIRUBIN, URINE, POC: NEGATIVE
BLOOD URINE, POC: NEGATIVE
EOSINOPHIL # BLD: 2 % (ref 0–6)
EOSINOPHILS ABSOLUTE: 0.1 K/UL (ref 0–0.5)
GLUCOSE URINE, POC: NEGATIVE
HCT VFR BLD CALC: 43.2 % (ref 35.1–46.5)
HEMOGLOBIN: 13.7 G/DL (ref 11.7–15.5)
KETONES, URINE, POC: NEGATIVE
LEUKOCYTE ESTERASE, URINE, POC: NEGATIVE
LYMPHOCYTES # BLD: 36 % (ref 20–45)
LYMPHOCYTES ABSOLUTE: 1.6 K/UL (ref 1–4.8)
MCH RBC QN AUTO: 30 PG (ref 26–34)
MCHC RBC AUTO-ENTMCNC: 32 G/DL (ref 31–36)
MCV RBC AUTO: 96 FL (ref 80–99)
MONOCYTES ABSOLUTE: 0.6 K/UL (ref 0.1–1)
MONOCYTES: 15 % (ref 3–12)
NEUTROPHILS ABSOLUTE: 2 K/UL (ref 1.8–7.7)
NEUTROPHILS SEGMENTED: 46 % (ref 40–75)
NITRITE, URINE, POC: NEGATIVE
PDW BLD-RTO: 13 % (ref 10–15.5)
PH, URINE, POC: 7 (ref 4.6–8)
PLATELET # BLD: 199 K/UL (ref 140–440)
PMV BLD AUTO: 11.6 FL (ref 9–13)
PROTEIN,URINE, POC: ABNORMAL
RBC # BLD: 4.51 M/UL (ref 3.8–5.2)
SPECIFIC GRAVITY, URINE, POC: 1.2 (ref 1–1.03)
URINALYSIS CLARITY, POC: ABNORMAL
URINALYSIS COLOR, POC: ABNORMAL
UROBILINOGEN, POC: NORMAL MG/DL
WBC # BLD: 4.3 K/UL (ref 4–11)

## 2025-06-18 PROCEDURE — 81001 URINALYSIS AUTO W/SCOPE: CPT | Performed by: NURSE PRACTITIONER

## 2025-06-18 PROCEDURE — 99213 OFFICE O/P EST LOW 20 MIN: CPT | Performed by: NURSE PRACTITIONER

## 2025-06-18 PROCEDURE — 99001 SPECIMEN HANDLING PT-LAB: CPT

## 2025-06-18 ASSESSMENT — PATIENT HEALTH QUESTIONNAIRE - PHQ9
SUM OF ALL RESPONSES TO PHQ QUESTIONS 1-9: 0
2. FEELING DOWN, DEPRESSED OR HOPELESS: NOT AT ALL
SUM OF ALL RESPONSES TO PHQ QUESTIONS 1-9: 0
SUM OF ALL RESPONSES TO PHQ QUESTIONS 1-9: 0
1. LITTLE INTEREST OR PLEASURE IN DOING THINGS: NOT AT ALL
SUM OF ALL RESPONSES TO PHQ QUESTIONS 1-9: 0

## 2025-06-18 NOTE — PROGRESS NOTES
Keya Jackson (:  1995) is a 30 y.o. female, here for evaluation of the following medical concerns:  Chief Complaint   Patient presents with    Vaginitis     No burning or itching. Started with discharge and 4 days ago smell started coming.   Using a vaginal probiotics.     Partner possibly has Herpes and would like a vaginal exam.          ASSESSMENT/PLAN:    Assessment & Plan  1. Vaginal discharge.  - Reports clear discharge with small white pieces and a foul, fishy odor.  - No symptoms of itching or burning during urination; urine test shows no signs of a urinary tract infection (UTI).  - Discharge and odor suggest possible bacterial vaginosis or yeast infection; advised to switch probiotics every 3 to 4 months and ensure they contain both prebiotics and probiotics.  - Swab test will be conducted to check for yeast and bacterial vaginosis; MetroGel will be prescribed if bacterial vaginosis is confirmed, and appropriate treatment will be initiated if yeast infection is confirmed.    2. Herpes Simplex Virus (HSV) screening.  - No visible rashes or bumps, and no symptoms of HSV such as burning or tingling per pt report,  deferred  - HSV-1 test will be included in the STD screening due to concerns about potential exposure.      3. Sexually Transmitted Disease (STD) screening.  - STD screening will include tests for HIV, syphilis, hepatitis B, and HSV-1.  - Results will be communicated via "[employer]hart or phone call if any treatment is necessary.  STD exposure  -     AMB POC URINALYSIS DIP STICK AUTO W/ MICRO  -     NuSwab Vaginitis Plus (VG+) with Candida (Six Species); Future  -     Hepatitis Be antigen; Future  -     HIV 1/2 Ag/Ab, 4TH Generation,W Rflx Confirm; Future  -     T. pallidum Ab; Future  -     CBC with Auto Differential; Future  -     Comprehensive Metabolic Panel; Future  -     Chlamydia, Gonorrhea, Trichomoniasis; Future  -     HSV 1 and 2 Specific Ab, IgG; Future  Vaginal discharge  -

## 2025-06-19 LAB
A/G RATIO: 1.8 RATIO (ref 1.1–2.6)
ALBUMIN: 4.6 G/DL (ref 3.5–5)
ALP BLD-CCNC: 49 U/L (ref 25–115)
ALT SERPL-CCNC: 13 U/L (ref 5–40)
ANION GAP SERPL CALCULATED.3IONS-SCNC: 12 MMOL/L (ref 3–15)
AST SERPL-CCNC: 21 U/L (ref 10–37)
BILIRUB SERPL-MCNC: 0.5 MG/DL (ref 0.2–1.2)
BUN BLDV-MCNC: 8 MG/DL (ref 6–22)
CALCIUM SERPL-MCNC: 10 MG/DL (ref 8.4–10.5)
CHLORIDE BLD-SCNC: 99 MMOL/L (ref 98–110)
CO2: 24 MMOL/L (ref 20–32)
CREAT SERPL-MCNC: 0.9 MG/DL (ref 0.5–1.2)
GFR, ESTIMATED: 81.9 ML/MIN/1.73 SQ.M.
GLOBULIN: 2.5 G/DL (ref 2–4)
GLUCOSE: 82 MG/DL (ref 70–99)
HERPES SIMPLEX VIRUS 1 IGG: >8 AI
HERPES SIMPLEX VIRUS 2 IGG: <0.2 AI
HIV INTERPRETATION: ABNORMAL
HIV-1 ANTIBODY: REACTIVE
HIV-2 AB: NON REACTIVE
HIV1/0/2 AB/AG: ABNORMAL
Lab: ABNORMAL
Lab: ABNORMAL
POTASSIUM SERPL-SCNC: 4.5 MMOL/L (ref 3.5–5.5)
SODIUM BLD-SCNC: 135 MMOL/L (ref 133–145)
T. PALLIDUM, IGG/IGM: NON REACTIVE
TOTAL PROTEIN: 7.1 G/DL (ref 6.4–8.3)

## 2025-06-20 LAB
CHLAMYDIA AMPLIFIED URINE: NEGATIVE
GC AMPLIFIED URINE: NEGATIVE
HEPATITIS BE ANTIGEN: NONREACTIVE
SENTARA SPECIMEN COLLECTION: NORMAL
TRICHOMONAS NUC AMP-URINE: NEGATIVE

## 2025-06-24 ENCOUNTER — RESULTS FOLLOW-UP (OUTPATIENT)
Facility: CLINIC | Age: 30
End: 2025-06-24

## 2025-06-24 DIAGNOSIS — B96.89 BACTERIAL VAGINOSIS: Primary | ICD-10-CM

## 2025-06-24 DIAGNOSIS — B00.9 PCR DNA POSITIVE FOR HSV1: ICD-10-CM

## 2025-06-24 DIAGNOSIS — Z11.59 PCR DNA POSITIVE FOR HSV1: ICD-10-CM

## 2025-06-24 DIAGNOSIS — N76.0 BACTERIAL VAGINOSIS: Primary | ICD-10-CM

## 2025-06-24 RX ORDER — METRONIDAZOLE 7.5 MG/G
1 GEL VAGINAL DAILY
Qty: 1 EACH | Refills: 0 | Status: SHIPPED | OUTPATIENT
Start: 2025-06-24 | End: 2025-06-29

## 2025-08-18 DIAGNOSIS — M25.832 ULNAR IMPINGEMENT SYNDROME OF LEFT UPPER EXTREMITY: ICD-10-CM

## 2025-08-18 DIAGNOSIS — Q74.0: ICD-10-CM
